# Patient Record
Sex: FEMALE | Race: WHITE | NOT HISPANIC OR LATINO | Employment: OTHER
[De-identification: names, ages, dates, MRNs, and addresses within clinical notes are randomized per-mention and may not be internally consistent; named-entity substitution may affect disease eponyms.]

---

## 2017-06-24 ENCOUNTER — HEALTH MAINTENANCE LETTER (OUTPATIENT)
Age: 62
End: 2017-06-24

## 2017-12-28 ENCOUNTER — HOSPITAL ENCOUNTER (OUTPATIENT)
Dept: BONE DENSITY | Facility: CLINIC | Age: 62
Discharge: HOME | End: 2017-12-28
Attending: INTERNAL MEDICINE | Admitting: INTERNAL MEDICINE
Payer: OTHER GOVERNMENT

## 2017-12-28 ENCOUNTER — HOSPITAL ENCOUNTER (OUTPATIENT)
Dept: MAMMOGRAPHY | Facility: CLINIC | Age: 62
End: 2017-12-28
Attending: INTERNAL MEDICINE
Payer: OTHER GOVERNMENT

## 2017-12-28 DIAGNOSIS — Z78.0 POSTMENOPAUSAL: ICD-10-CM

## 2017-12-28 DIAGNOSIS — Z12.31 VISIT FOR SCREENING MAMMOGRAM: ICD-10-CM

## 2017-12-28 PROCEDURE — 77080 DXA BONE DENSITY AXIAL: CPT

## 2017-12-28 PROCEDURE — G0202 SCR MAMMO BI INCL CAD: HCPCS

## 2019-01-07 ENCOUNTER — HOSPITAL ENCOUNTER (OUTPATIENT)
Dept: MAMMOGRAPHY | Facility: CLINIC | Age: 64
Discharge: HOME | End: 2019-01-07
Attending: INTERNAL MEDICINE | Admitting: INTERNAL MEDICINE
Payer: OTHER GOVERNMENT

## 2019-01-07 DIAGNOSIS — Z12.31 VISIT FOR SCREENING MAMMOGRAM: ICD-10-CM

## 2019-01-07 PROCEDURE — 77067 SCR MAMMO BI INCL CAD: CPT

## 2019-12-16 ENCOUNTER — HEALTH MAINTENANCE LETTER (OUTPATIENT)
Age: 64
End: 2019-12-16

## 2020-06-04 ENCOUNTER — RECORDS - HEALTHEAST (OUTPATIENT)
Dept: ADMINISTRATIVE | Facility: OTHER | Age: 65
End: 2020-06-04

## 2021-01-15 ENCOUNTER — HEALTH MAINTENANCE LETTER (OUTPATIENT)
Age: 66
End: 2021-01-15

## 2021-03-21 ENCOUNTER — HEALTH MAINTENANCE LETTER (OUTPATIENT)
Age: 66
End: 2021-03-21

## 2021-09-05 ENCOUNTER — HEALTH MAINTENANCE LETTER (OUTPATIENT)
Age: 66
End: 2021-09-05

## 2022-02-20 ENCOUNTER — HEALTH MAINTENANCE LETTER (OUTPATIENT)
Age: 67
End: 2022-02-20

## 2022-10-23 ENCOUNTER — HEALTH MAINTENANCE LETTER (OUTPATIENT)
Age: 67
End: 2022-10-23

## 2023-04-02 ENCOUNTER — HEALTH MAINTENANCE LETTER (OUTPATIENT)
Age: 68
End: 2023-04-02

## 2023-07-05 ENCOUNTER — HOSPITAL ENCOUNTER (OUTPATIENT)
Dept: MAMMOGRAPHY | Facility: CLINIC | Age: 68
Discharge: HOME | End: 2023-07-05
Attending: FAMILY MEDICINE | Admitting: FAMILY MEDICINE
Payer: MEDICARE

## 2023-07-05 DIAGNOSIS — Z12.39 SCREENING FOR BREAST CANCER: ICD-10-CM

## 2023-07-05 PROCEDURE — 77067 SCR MAMMO BI INCL CAD: CPT

## 2023-11-05 ENCOUNTER — HEALTH MAINTENANCE LETTER (OUTPATIENT)
Age: 68
End: 2023-11-05

## 2024-09-15 ENCOUNTER — OFFICE VISIT (OUTPATIENT)
Dept: URGENT CARE | Facility: URGENT CARE | Age: 69
End: 2024-09-15
Payer: MEDICARE

## 2024-09-15 VITALS
SYSTOLIC BLOOD PRESSURE: 120 MMHG | TEMPERATURE: 97.7 F | HEART RATE: 84 BPM | RESPIRATION RATE: 16 BRPM | WEIGHT: 132 LBS | DIASTOLIC BLOOD PRESSURE: 83 MMHG | OXYGEN SATURATION: 100 %
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
## 2024-09-15 DIAGNOSIS — S51.852A CAT BITE OF LEFT FOREARM, INITIAL ENCOUNTER: Primary | ICD-10-CM

## 2024-09-15 PROCEDURE — 99203 OFFICE O/P NEW LOW 30 MIN: CPT | Performed by: FAMILY MEDICINE

## 2024-09-15 NOTE — PROGRESS NOTES
SUBJECTIVE:  Chief Complaint   Patient presents with    Cat Bite     Yesterday, patient cat-left forearm, redness-swelling, loose stools,  tdap 3/20/20-leaving on cruise tomorrow     Cheryl Wiggins is a 69 year old female who presents with a chief complaint of cat bite on left forearm.    Tdap 2020    Was reaching out to pet her cat when accidentally got bitten, this was early in the morning and cat was not having it.  No concerns for rabies, immunizations UTD.    Washed out wound.  Has mild redness today, has some discomfort.    [place] cruise [place]    Past Medical History:   Diagnosis Date    Genital herpes, unspecified      Current Outpatient Medications   Medication Sig Dispense Refill    BIOTIN PO Take  by mouth.      Calcium 600 MG tablet Take 3 tablets by mouth daily. 100 tablet 3    fish oil-omega-3 fatty acids 1000 MG capsule Take 2 capsules by mouth daily. 180 capsule 12    MULTI-VITAMIN OR TABS       PREBIOTIC PRODUCT PO Take  by mouth.      Probiotic Product (PROBIOTIC FORMULA PO) Take  by mouth.       Social History     Tobacco Use    Smoking status: Never    Smokeless tobacco: Never   Substance Use Topics    Alcohol use: Yes     Alcohol/week: 5.8 standard drinks of alcohol     Types: 7 drink(s) per week       ROS:  Review of systems negative except as stated above.    EXAM:   /83   Pulse 84   Temp 97.7  F (36.5  C)   Resp 16   Wt 59.9 kg (132 lb)   LMP 02/25/2005   SpO2 100%   GENERAL APPEARANCE: healthy, alert and no distress  EXTREMITIES: peripheral pulses normal  SKIN: left forearm with 1 puncture bite wound with surrounding pink patch with warmth, mild tenderness  PSYCH:alert, affect bright      ASSESSMENT/PLAN:  (S51.852A,  W55.01XA) Cat bite of left forearm, initial encounter  (primary encounter diagnosis)  Plan: amoxicillin-clavulanate (AUGMENTIN) 875-125 MG         tablet            Sustained cat bite yesterday, skin presentation is concerning for wound  infection and reviewed that cat bites can cause infection much easier.  RX Augmentin given for treatment.  Okay for tylenol, ibuprofen, warm compress to area.    Follow up with primary provider if no improvement of symptoms     Margarito Tobias MD  September 15, 2024 9:46 AM

## 2024-12-22 ENCOUNTER — HEALTH MAINTENANCE LETTER (OUTPATIENT)
Age: 69
End: 2024-12-22